# Patient Record
Sex: MALE | Race: BLACK OR AFRICAN AMERICAN | Employment: OTHER | URBAN - METROPOLITAN AREA
[De-identification: names, ages, dates, MRNs, and addresses within clinical notes are randomized per-mention and may not be internally consistent; named-entity substitution may affect disease eponyms.]

---

## 2022-07-23 ENCOUNTER — APPOINTMENT (OUTPATIENT)
Dept: GENERAL RADIOLOGY | Age: 62
End: 2022-07-23
Payer: COMMERCIAL

## 2022-07-23 ENCOUNTER — HOSPITAL ENCOUNTER (EMERGENCY)
Age: 62
Discharge: HOME | End: 2022-07-23
Attending: EMERGENCY MEDICINE
Payer: COMMERCIAL

## 2022-07-23 VITALS
RESPIRATION RATE: 15 BRPM | WEIGHT: 171.4 LBS | BODY MASS INDEX: 33.65 KG/M2 | SYSTOLIC BLOOD PRESSURE: 124 MMHG | HEART RATE: 60 BPM | HEIGHT: 60 IN | OXYGEN SATURATION: 94 % | DIASTOLIC BLOOD PRESSURE: 83 MMHG | TEMPERATURE: 98.3 F

## 2022-07-23 DIAGNOSIS — N41.0 PROSTATITIS, ACUTE: Primary | ICD-10-CM

## 2022-07-23 LAB
A/G RATIO: 1 (ref 1.1–2.2)
A/G RATIO: 1.1 (ref 1.1–2.2)
ALBUMIN SERPL-MCNC: 3.8 G/DL (ref 3.4–5)
ALBUMIN SERPL-MCNC: 4 G/DL (ref 3.4–5)
ALP BLD-CCNC: 83 U/L (ref 40–129)
ALP BLD-CCNC: 88 U/L (ref 40–129)
ALT SERPL-CCNC: 7 U/L (ref 10–40)
ALT SERPL-CCNC: 9 U/L (ref 10–40)
ANION GAP SERPL CALCULATED.3IONS-SCNC: 16 MMOL/L (ref 3–16)
ANION GAP SERPL CALCULATED.3IONS-SCNC: 7 MMOL/L (ref 3–16)
AST SERPL-CCNC: 11 U/L (ref 15–37)
AST SERPL-CCNC: 21 U/L (ref 15–37)
BILIRUB SERPL-MCNC: 0.5 MG/DL (ref 0–1)
BILIRUB SERPL-MCNC: 0.6 MG/DL (ref 0–1)
BUN BLDV-MCNC: 6 MG/DL (ref 7–20)
BUN BLDV-MCNC: 6 MG/DL (ref 7–20)
CALCIUM SERPL-MCNC: 8.5 MG/DL (ref 8.3–10.6)
CALCIUM SERPL-MCNC: 9.3 MG/DL (ref 8.3–10.6)
CHLORIDE BLD-SCNC: 106 MMOL/L (ref 99–110)
CHLORIDE BLD-SCNC: 109 MMOL/L (ref 99–110)
CO2: 17 MMOL/L (ref 21–32)
CO2: 26 MMOL/L (ref 21–32)
CREAT SERPL-MCNC: 1 MG/DL (ref 0.8–1.3)
CREAT SERPL-MCNC: 1.1 MG/DL (ref 0.8–1.3)
EKG ATRIAL RATE: 78 BPM
EKG DIAGNOSIS: NORMAL
EKG P AXIS: 59 DEGREES
EKG P-R INTERVAL: 130 MS
EKG Q-T INTERVAL: 368 MS
EKG QRS DURATION: 84 MS
EKG QTC CALCULATION (BAZETT): 419 MS
EKG R AXIS: -15 DEGREES
EKG T AXIS: 12 DEGREES
EKG VENTRICULAR RATE: 78 BPM
GFR AFRICAN AMERICAN: >60
GFR AFRICAN AMERICAN: >60
GFR NON-AFRICAN AMERICAN: >60
GFR NON-AFRICAN AMERICAN: >60
GLUCOSE BLD-MCNC: 92 MG/DL (ref 70–99)
GLUCOSE BLD-MCNC: 96 MG/DL (ref 70–99)
HCT VFR BLD CALC: 44.2 % (ref 40.5–52.5)
HEMOGLOBIN: 14.4 G/DL (ref 13.5–17.5)
MCH RBC QN AUTO: 33.7 PG (ref 26–34)
MCHC RBC AUTO-ENTMCNC: 32.7 G/DL (ref 31–36)
MCV RBC AUTO: 102.9 FL (ref 80–100)
PDW BLD-RTO: 14.9 % (ref 12.4–15.4)
PLATELET # BLD: 359 K/UL (ref 135–450)
PMV BLD AUTO: 7.6 FL (ref 5–10.5)
POTASSIUM REFLEX MAGNESIUM: 4.1 MMOL/L (ref 3.5–5.1)
POTASSIUM SERPL-SCNC: 4 MMOL/L (ref 3.5–5.1)
PRO-BNP: 75 PG/ML (ref 0–124)
RBC # BLD: 4.29 M/UL (ref 4.2–5.9)
SODIUM BLD-SCNC: 139 MMOL/L (ref 136–145)
SODIUM BLD-SCNC: 142 MMOL/L (ref 136–145)
TOTAL PROTEIN: 7.3 G/DL (ref 6.4–8.2)
TOTAL PROTEIN: 8.2 G/DL (ref 6.4–8.2)
TROPONIN: <0.01 NG/ML
TROPONIN: <0.01 NG/ML
WBC # BLD: 5 K/UL (ref 4–11)

## 2022-07-23 PROCEDURE — 6370000000 HC RX 637 (ALT 250 FOR IP): Performed by: STUDENT IN AN ORGANIZED HEALTH CARE EDUCATION/TRAINING PROGRAM

## 2022-07-23 PROCEDURE — 83880 ASSAY OF NATRIURETIC PEPTIDE: CPT

## 2022-07-23 PROCEDURE — 36415 COLL VENOUS BLD VENIPUNCTURE: CPT

## 2022-07-23 PROCEDURE — 80053 COMPREHEN METABOLIC PANEL: CPT

## 2022-07-23 PROCEDURE — 99285 EMERGENCY DEPT VISIT HI MDM: CPT

## 2022-07-23 PROCEDURE — 93005 ELECTROCARDIOGRAM TRACING: CPT | Performed by: EMERGENCY MEDICINE

## 2022-07-23 PROCEDURE — 84484 ASSAY OF TROPONIN QUANT: CPT

## 2022-07-23 PROCEDURE — 85027 COMPLETE CBC AUTOMATED: CPT

## 2022-07-23 PROCEDURE — 71045 X-RAY EXAM CHEST 1 VIEW: CPT

## 2022-07-23 RX ORDER — RITONAVIR 100 MG/1
100 TABLET ORAL ONCE
Status: DISCONTINUED | OUTPATIENT
Start: 2022-07-23 | End: 2022-07-23

## 2022-07-23 RX ORDER — EMTRICITABINE AND TENOFOVIR DISOPROXIL FUMARATE 200; 300 MG/1; MG/1
1 TABLET, FILM COATED ORAL ONCE
Status: DISCONTINUED | OUTPATIENT
Start: 2022-07-23 | End: 2022-07-23

## 2022-07-23 RX ORDER — PHENYTOIN 50 MG/1
100 TABLET, CHEWABLE ORAL ONCE
Status: COMPLETED | OUTPATIENT
Start: 2022-07-23 | End: 2022-07-23

## 2022-07-23 RX ORDER — ATAZANAVIR 150 MG/1
300 CAPSULE ORAL ONCE
Status: DISCONTINUED | OUTPATIENT
Start: 2022-07-23 | End: 2022-07-23

## 2022-07-23 RX ORDER — LEVOFLOXACIN 500 MG/1
500 TABLET, FILM COATED ORAL DAILY
Qty: 42 TABLET | Refills: 0 | Status: SHIPPED | OUTPATIENT
Start: 2022-07-23 | End: 2022-09-03

## 2022-07-23 RX ADMIN — BICTEGRAVIR SODIUM, EMTRICITABINE, AND TENOFOVIR ALAFENAMIDE FUMARATE 1 TABLET: 50; 200; 25 TABLET ORAL at 14:45

## 2022-07-23 RX ADMIN — PHENYTOIN 100 MG: 50 TABLET, CHEWABLE ORAL at 14:45

## 2022-07-23 ASSESSMENT — PAIN - FUNCTIONAL ASSESSMENT
PAIN_FUNCTIONAL_ASSESSMENT: 0-10
PAIN_FUNCTIONAL_ASSESSMENT: ACTIVITIES ARE NOT PREVENTED

## 2022-07-23 ASSESSMENT — PAIN DESCRIPTION - DESCRIPTORS: DESCRIPTORS: SHARP

## 2022-07-23 ASSESSMENT — ENCOUNTER SYMPTOMS
EYE PAIN: 0
DIARRHEA: 0
NAUSEA: 1
WHEEZING: 0
SHORTNESS OF BREATH: 1
COUGH: 0
ABDOMINAL PAIN: 1
VOMITING: 0

## 2022-07-23 ASSESSMENT — PAIN DESCRIPTION - ORIENTATION: ORIENTATION: MID

## 2022-07-23 ASSESSMENT — PAIN DESCRIPTION - PAIN TYPE: TYPE: ACUTE PAIN

## 2022-07-23 ASSESSMENT — PAIN DESCRIPTION - FREQUENCY: FREQUENCY: CONTINUOUS

## 2022-07-23 ASSESSMENT — PAIN DESCRIPTION - ONSET: ONSET: ON-GOING

## 2022-07-23 ASSESSMENT — PAIN SCALES - GENERAL: PAINLEVEL_OUTOF10: 10

## 2022-07-23 ASSESSMENT — PAIN DESCRIPTION - LOCATION: LOCATION: ABDOMEN;CHEST

## 2022-07-23 NOTE — ED PROVIDER NOTES
[address] [facility] ENCOUNTER          ATTENDING PHYSICIAN NOTE       Date of evaluation: 7/23/2022    ADDENDUM:      Care of this patient was assumed from Dr. Sarah Everett. The patient was seen for Chest Pain. The patient's initial evaluation and plan have been discussed with the prior provider who initially evaluated the patient. Nursing Notes, Past Medical Hx, Past Surgical Hx, Social Hx, Allergies, and Family Hx were all reviewed. Diagnostic Results     EKG   Sinus rhythm at 78 bpm, normal axis and intervals, no hypertrophy, no ectopy, no ST elevation, biphasic T waves in the anterior leads, unchanged from 12/20/2014. RADIOLOGY:  XR CHEST PORTABLE   Final Result      1. Bilateral perihilar opacities which may reflect edema or infection. LABS:   Results for orders placed or performed during the hospital encounter of 07/23/22   CBC   Result Value Ref Range    WBC 5.0 4.0 - 11.0 K/uL    RBC 4.29 4. 20 - 5.90 M/uL    Hemoglobin 14.4 13.5 - 17.5 g/dL    Hematocrit 44.2 40.5 - 52.5 %    .9 (H) 80.0 - 100.0 fL    MCH 33.7 26.0 - 34.0 pg    MCHC 32.7 31.0 - 36.0 g/dL    RDW 14.9 12.4 - 15.4 %    Platelets 500 554 - 615 K/uL    MPV 7.6 5.0 - 10.5 fL   Comprehensive Metabolic Panel   Result Value Ref Range    Sodium 142 136 - 145 mmol/L    Potassium 4.0 3.5 - 5.1 mmol/L    Chloride 109 99 - 110 mmol/L    CO2 17 (L) 21 - 32 mmol/L    Anion Gap 16 3 - 16    Glucose 92 70 - 99 mg/dL    BUN 6 (L) 7 - 20 mg/dL    Creatinine 1.1 0.8 - 1.3 mg/dL    GFR Non-African American >60 >60    GFR African American >60 >60    Calcium 9.3 8.3 - 10.6 mg/dL    Total Protein 8.2 6.4 - 8.2 g/dL    Albumin 4.0 3.4 - 5.0 g/dL    Albumin/Globulin Ratio 1.0 (L) 1.1 - 2.2    Total Bilirubin 0.6 0.0 - 1.0 mg/dL    Alkaline Phosphatase 88 40 - 129 U/L    ALT 9 (L) 10 - 40 U/L    AST 21 15 - 37 U/L   Troponin   Result Value Ref Range    Troponin <0.01 <0.01 ng/mL   Troponin   Result Value Ref Range    Troponin <0.01 <0.01 ng/mL   Comprehensive Metabolic Panel w/ Reflex to MG   Result Value Ref Range    Sodium 139 136 - 145 mmol/L    Potassium reflex Magnesium 4.1 3.5 - 5.1 mmol/L    Chloride 106 99 - 110 mmol/L    CO2 26 21 - 32 mmol/L    Anion Gap 7 3 - 16    Glucose 96 70 - 99 mg/dL    BUN 6 (L) 7 - 20 mg/dL    Creatinine 1.0 0.8 - 1.3 mg/dL    GFR Non-African American >60 >60    GFR African American >60 >60    Calcium 8.5 8.3 - 10.6 mg/dL    Total Protein 7.3 6.4 - 8.2 g/dL    Albumin 3.8 3.4 - 5.0 g/dL    Albumin/Globulin Ratio 1.1 1.1 - 2.2    Total Bilirubin 0.5 0.0 - 1.0 mg/dL    Alkaline Phosphatase 83 40 - 129 U/L    ALT 7 (L) 10 - 40 U/L    AST 11 (L) 15 - 37 U/L   Brain Natriuretic Peptide   Result Value Ref Range    Pro-BNP 75 0 - 124 pg/mL   EKG 12 Lead   Result Value Ref Range    Ventricular Rate 78 BPM    Atrial Rate 78 BPM    P-R Interval 130 ms    QRS Duration 84 ms    Q-T Interval 368 ms    QTc Calculation (Bazett) 419 ms    P Axis 59 degrees    R Axis -15 degrees    T Axis 12 degrees    Diagnosis       EKG performed in ER and to be interpreted by ER physician. Confirmed by MD, ER ([id number]),  Juan Ovalle ([phone]) on 7/23/2022 7:55:44 AM       RECENT VITALS:  BP: 124/83, Temp: 98.3 °F (36.8 °C), Heart Rate: 60, Resp: 15, SpO2: 94 %     Procedures     None    ED Course          The patient was given the following medications:  Orders Placed This Encounter   Medications    phenytoin (DILANTIN) chewable tablet 100 mg    DISCONTD: atazanavir (REYATAZ) capsule 300 mg    DISCONTD: emtricitabine-tenofovir (TRUVADA) 200-300 MG per tablet 1 tablet    DISCONTD: ritonavir (NORVIR) tablet 100 mg    bictegravir-emtricitab-tenofovir alafenamide (BIKTARVY) -25 MG per tablet 1 tablet    levoFLOXacin (LEVAQUIN) 500 MG tablet     Sig: Take 1 tablet by mouth in the morning. Indications: Prostatitis.      Dispense:  42 tablet     Refill:  0       CONSULTS:  None    MEDICAL DECISION Kiko Salvadorist / Raudel Desouza / Kellie Torres Raegan Goldman is a 64 y.o. male who presented to the emergency department with lower abdominal pain, chest pain, and shortness of breath. He was admitted for similar symptoms on 6/25/2022 and diagnosed with acute prostatitis. He was discharged on antibiotics, which he stopped taking because he felt better. He then represented to the [facility] emergency department yesterday, where he had a CT scan that confirmed acute prostatitis and cystitis. He was discharged on Cipro and Flagyl, though he was unable to fill that prescription because [facility] was closed. He now comes in today with ongoing symptoms as well as new shortness of breath. On exam, he was well-appearing and in no acute distress. Labs were reassuring with no leukocytosis or anemia, normal creatinine, no transaminitis, negative troponin x2, and BNP of 75. CXR showed ill-defined perihilar opacities concerning for infection versus edema. EKG was unchanged from prior. Given patient's report of cough and shortness of breath, reassuring cardiac biomarkers, normal EKG, I feel that pneumonia is more likely than pulmonary edema as etiology for his symptoms. He has a CURB 65 score of 0, no oxygen requirement, and although he is HIV positive he is on ART with a WBC of 5. Levaquin would cover both his prostatitis and a pneumonia, and he will be given a discharge prescription for same, with a planned 6-week course given treatment of prostatitis. Patient does follow closely with ID, and was encouraged to follow-up with them as an outpatient. At this time, patient has been deemed safe for discharge. Discharge instructions, including strict return precautions for new or worsening symptoms, have been communicated. Clinical Impression     1.  Prostatitis, acute        Disposition     PATIENT REFERRED TO:  The [facility] Emergency Department  [address] [place] [address]  [phone]  Go to   If symptoms worsen    DISCHARGE MEDICATIONS:  Discharge Medication List as of 7/23/2022  4:32 PM        START taking these medications    Details   levoFLOXacin (LEVAQUIN) 500 MG tablet Take 1 tablet by mouth in the morning.  Indications: Prostatitis., Disp-42 tablet, R-0Print             DISPOSITION Decision To Discharge 07/23/2022 06:06:19 PM         Josee Andre MD  07/23/22 [id number]
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
## 2022-07-23 NOTE — ED NOTES
Patient prepared for and ready to be discharged. Patient discharged at this time to home in care of self in no acute distress after verbalizing understanding of discharge instructions. Patient left after receiving After Visit Summary instructions. IV removed prior to discharge.        Dylan Hsu RN  07/23/22 [id number]

## 2022-07-23 NOTE — ED NOTES
Pt. In no acute distress. Breathing easy and educated on follow up care and medications. Ambulated out of ED.       Sonu Parikh RN  07/23/22 2229

## 2022-07-23 NOTE — ED TRIAGE NOTES
Patient arrived in the ER with c/o chest pain. Patient states that chest pain start yesterday and it felt  sharp.

## 2022-07-23 NOTE — DISCHARGE INSTRUCTIONS
You were seen in the emergency department for abdominal pain and cough. Your symptoms are consistent with prostatitis. Your chest x-ray was also concerning for a possible pneumonia, however your vital signs and oxygen levels were reassuring. You are being prescribed an antibiotic, Levaquin, that will treat your prostatitis and will also treat pneumonia if that is what you have. It is important that you take the full course of medication even if you start to feel better before all the medicine is gone. You should follow-up with your primary care doctor as well as your ID doctor for further care. You should continue to take your HIV medication as you have been doing.     You should return to the emergency department if:  -You have new or worsening pain  -You are unable to take your medications  -You have nausea or vomiting that prevents you from eating or drinking  -You have a fever >100.4 F  -You have worsening shortness of breath  -You have any other symptoms you find concerning